# Patient Record
Sex: MALE | Race: OTHER | Employment: FULL TIME | URBAN - METROPOLITAN AREA
[De-identification: names, ages, dates, MRNs, and addresses within clinical notes are randomized per-mention and may not be internally consistent; named-entity substitution may affect disease eponyms.]

---

## 2022-03-19 ENCOUNTER — HOSPITAL ENCOUNTER (EMERGENCY)
Age: 32
Discharge: HOME | End: 2022-03-19
Attending: EMERGENCY MEDICINE
Payer: OTHER MISCELLANEOUS

## 2022-03-19 VITALS
SYSTOLIC BLOOD PRESSURE: 138 MMHG | DIASTOLIC BLOOD PRESSURE: 70 MMHG | BODY MASS INDEX: 32.11 KG/M2 | RESPIRATION RATE: 18 BRPM | HEIGHT: 67 IN | OXYGEN SATURATION: 100 % | TEMPERATURE: 97.7 F | HEART RATE: 78 BPM | WEIGHT: 204.59 LBS

## 2022-03-19 DIAGNOSIS — S61.211A LACERATION OF LEFT INDEX FINGER WITHOUT FOREIGN BODY WITHOUT DAMAGE TO NAIL, INITIAL ENCOUNTER: Primary | ICD-10-CM

## 2022-03-19 PROCEDURE — 99282 EMERGENCY DEPT VISIT SF MDM: CPT

## 2022-03-19 PROCEDURE — 75810000293 HC SIMP/SUPERF WND  RPR

## 2022-03-19 NOTE — ED PROVIDER NOTES
EMERGENCY DEPARTMENT HISTORY AND PHYSICAL EXAM    Date: 3/19/2022  Patient Name: Douglas Daugherty    History of Presenting Illness     Time Seen: 2:19 PM    Chief Complaint   Patient presents with    Laceration       History Provided By: Patient    Additional History (Context):   Douglas Daugherty is a 27 y.o. male works at [employer] presents emergency room with complaints of laceration to his left hand index finger after accidentally cutting himself with a knife. Complains of laceration to the back of his index finger. Some bleeding but controlled by holding pressure. No numbness or tingling. No other injuries. He is right-hand dominant. PCP: None        Past History     Past Medical History:  Past Medical History:   Diagnosis Date    Diabetes [facility])        Past Surgical History:  History reviewed. No pertinent surgical history. Family History:  History reviewed. No pertinent family history. Social History:  Social History     Tobacco Use    Smoking status: Never Smoker    Smokeless tobacco: Not on file   Substance Use Topics    Alcohol use: Not Currently    Drug use: Not Currently       Allergies:  No Known Allergies      Review of Systems   Review of Systems   Skin: Positive for wound. Left index finger laceration   Neurological: Negative for weakness and numbness. All other systems reviewed and are negative. Physical Exam     Vitals:    03/19/22 1351 03/19/22 1545   BP: (!) 147/78 138/70   Pulse: 81 78   Resp: 16 18   Temp: 97.7 °F (36.5 °C)    SpO2: 99% 100%   Weight: 92.8 kg (204 lb 9.4 oz)    Height: 5' 7\" (1.702 m)      Physical Exam  Vitals and nursing note reviewed. Constitutional:       General: He is not in acute distress. Appearance: Normal appearance. He is well-developed, well-groomed and normal weight. Comments: 25-year-old male no apparent distress. Vital signs are stable. Cooperative.    Cardiovascular:      Rate and Rhythm: Normal rate and regular rhythm. Heart sounds: Normal heart sounds. Pulmonary:      Effort: Pulmonary effort is normal.      Breath sounds: Normal breath sounds. Musculoskeletal:      Left hand: Laceration and tenderness present. No swelling or bony tenderness. Normal range of motion. Normal strength. Normal sensation. Normal capillary refill. Comments: Left hand -index finger overlying the proximal phalanx dorsally reveals a 2.5 cm linear laceration. Full-thickness laceration. No active bleeding. Unable to visualize deep tendon. Patient has full range of motion in the index finger with no deficits in strength. Neurovascular intact distally. Skin:     General: Skin is warm and dry. Neurological:      Mental Status: He is alert and oriented to person, place, and time. Psychiatric:         Behavior: Behavior is cooperative. Nursing note and vitals reviewed         Diagnostic Study Results     Labs -   No results found for this or any previous visit (from the past 12 hour(s)). Radiologic Studies   No orders to display     CT Results  (Last 48 hours)    None        CXR Results  (Last 48 hours)    None            Medical Decision Making   I am the first provider for this patient. I reviewed the vital signs, available nursing notes, past medical history, past surgical history, family history and social history. Vital Signs-Reviewed the patient's vital signs. Records Reviewed: Nursing Notes    DDX:  Laceration left index finger    Procedures:  Wound Repair    Date/Time: 3/20/2022 3:29 PM  Performed by: PA ([address] provider: Roro Ledesma  Preparation: skin prepped with Betadine  Pre-procedure re-eval: Immediately prior to the procedure, the patient was reevaluated and found suitable for the planned procedure and any planned medications.   Location details: left index finger  Wound length:2.5 cm or less  Anesthesia: local infiltration    Anesthesia:  Local Anesthetic: lidocaine 1% without epinephrine  Anesthetic total: 3 mL  Foreign bodies: no foreign bodies  Irrigation solution: saline  Irrigation method: syringe  Debridement: none  Skin closure: Prolene (5-0)  Number of sutures: 6  Technique: simple  Approximation: close  Dressing: antibiotic ointment and tube gauze  Patient tolerance: patient tolerated the procedure well with no immediate complications  My total time at bedside, performing this procedure was 16-30 minutes. ED Course:   Initial assessment performed. The patients presenting problems have been discussed, and they are in agreement with the care plan formulated and outlined with them. I have encouraged them to ask questions as they arise throughout their visit. ED Physician Discussion Note:   Patient tolerated procedure well  Stitches out in 10 days  Limited use of hand  Tylenol for pain    Diagnosis and Disposition       DISCHARGE NOTE:  Maxi Kimble's  results have been reviewed with him. He has been counseled regarding his diagnosis, treatment, and plan. He verbally conveys understanding and agreement of the signs, symptoms, diagnosis, treatment and prognosis and additionally agrees to follow up as discussed. He also agrees with the care-plan and conveys that all of his questions have been answered. I have also provided discharge instructions for him that include: educational information regarding their diagnosis and treatment, and list of reasons why they would want to return to the ED prior to their follow-up appointment, should his condition change. He has been provided with education for proper emergency department utilization. CLINICAL IMPRESSION:    1. Laceration of left index finger without foreign body without damage to nail, initial encounter        PLAN:  1. D/C Home  2. There are no discharge medications for this patient.     3.   Follow-up Information     Follow up With Specialties Details Why [address]    [facility] EMERGENCY DEPT Emergency Medicine  Return to this department in 10 days to have sutures removed [address] [place] [address]  [phone]        ____________________________________     Please note that this dictation was completed with [employer], the computer voice recognition software. Quite often unanticipated grammatical, syntax, homophones, and other interpretive errors are inadvertently transcribed by the computer software. Please disregard these errors. Please excuse any errors that have escaped final proofreading.

## 2022-03-19 NOTE — DISCHARGE INSTRUCTIONS
Stitches out in 10 days  Limited use of hand/finger  Try to keep finger clean and dry  Tylenol for pain  Apply bacitracin/Neosporin to the wound for the next 2 days

## 2022-03-28 ENCOUNTER — HOSPITAL ENCOUNTER (EMERGENCY)
Age: 32
Discharge: HOME | End: 2022-03-28
Attending: EMERGENCY MEDICINE | Admitting: STUDENT IN AN ORGANIZED HEALTH CARE EDUCATION/TRAINING PROGRAM

## 2022-03-28 VITALS
WEIGHT: 200 LBS | RESPIRATION RATE: 16 BRPM | TEMPERATURE: 97.7 F | OXYGEN SATURATION: 100 % | BODY MASS INDEX: 31.39 KG/M2 | HEIGHT: 67 IN | DIASTOLIC BLOOD PRESSURE: 72 MMHG | HEART RATE: 98 BPM | SYSTOLIC BLOOD PRESSURE: 132 MMHG

## 2022-03-28 DIAGNOSIS — Z48.02 ENCOUNTER FOR REMOVAL OF SUTURES: Primary | ICD-10-CM

## 2022-03-28 PROCEDURE — 75810000275 HC EMERGENCY DEPT VISIT NO LEVEL OF CARE

## 2022-03-28 RX ORDER — METFORMIN HYDROCHLORIDE 1000 MG/1
1000 TABLET ORAL 2 TIMES DAILY WITH MEALS
COMMUNITY

## 2022-03-28 NOTE — ED PROVIDER NOTES
EMERGENCY DEPARTMENT HISTORY AND PHYSICAL EXAM    Date: 3/28/2022  Patient Name: Donnice Frankel    History of Presenting Illness     Chief Complaint   Patient presents with    Suture Removal         History Provided By: Patient    Chief Complaint: suture removal    HPI(Context):   4:03 PM  Donnice Frankel is a 27 y.o. male with PMHX of DM who presents to the emergency department C/O suture removal. Pt had 6 simple interrupted sutures placed to left index finger at this facility on 03/19/2022. Pt notes no problems. Healing well. Pt denies numbness, weakness, color change, wound drainage, and any other sxs or complaints. PCP: None    Current Outpatient Medications   Medication Sig Dispense Refill    metFORMIN (GLUCOPHAGE) 1,000 mg tablet Take 1,000 mg by mouth two (2) times daily (with meals). Past History     Past Medical History:  Past Medical History:   Diagnosis Date    Diabetes [facility])        Past Surgical History:  History reviewed. No pertinent surgical history. Family History:  History reviewed. No pertinent family history. Social History:  Social History     Tobacco Use    Smoking status: Never Smoker    Smokeless tobacco: Not on file   Substance Use Topics    Alcohol use: Not Currently    Drug use: Not Currently       Allergies:  No Known Allergies      Review of Systems   Review of Systems   Skin: Positive for wound. Negative for color change. Neurological: Negative for weakness and numbness. All other systems reviewed and are negative. Physical Exam     Vitals:    03/28/22 1536   BP: 132/72   Pulse: 98   Resp: 16   Temp: 97.7 °F (36.5 °C)   SpO2: 100%   Weight: 90.7 kg (200 lb)   Height: 5' 7\" (1.702 m)     Physical Exam  Vitals and nursing note reviewed. Constitutional:       General: He is not in acute distress. Appearance: He is well-developed. He is not diaphoretic. Comments:  male in NAD. Alert.  In RW   HENT:      Head: Normocephalic and atraumatic. Right Ear: External ear normal.      Left Ear: External ear normal.      Nose: Nose normal.   Eyes:      Conjunctiva/sclera: Conjunctivae normal.   Pulmonary:      Effort: Pulmonary effort is normal. No tachypnea or accessory muscle usage. Breath sounds: No decreased breath sounds. Musculoskeletal:         General: Normal range of motion. Hands:       Cervical back: Normal range of motion. Skin:     General: Skin is warm and dry. Neurological:      Mental Status: He is alert and oriented to person, place, and time. Psychiatric:         Judgment: Judgment normal.             Diagnostic Study Results     Labs -   No results found for this or any previous visit (from the past 12 hour(s)). No orders to display     CT Results  (Last 48 hours)    None        CXR Results  (Last 48 hours)    None          Medications given in the ED-  Medications - No data to display      Medical Decision Making   I am the first provider for this patient. I reviewed the vital signs, available nursing notes, past medical history, past surgical history, family history and social history. Vital Signs-Reviewed the patient's vital signs. Pulse Oximetry Analysis - 100% on RA. NORMAL     Records Reviewed: Nursing Notes and Old Medical Records    Provider Notes (Medical Decision Making): suture removal    Procedures:  Suture/Staple Removal    Date/Time: 3/28/2022 4:15 PM  Performed by: Kalie Pitts PA-C  Authorized by: Kalie Pitts PA-C     Consent:     Consent obtained:  Verbal    Consent given by:  Patient  Location:     Location:  Upper extremity    Upper extremity location:  Hand    Hand location:  L index finger  Procedure details:     Wound appearance:  No signs of infection    Number of sutures removed:  6  Post-procedure details:     Post-removal:  No dressing applied    Patient tolerance of procedure:   Tolerated well, no immediate complications        ED Course:   4:03 PM Initial assessment performed. The patients presenting problems have been discussed, and they are in agreement with the care plan formulated and outlined with them. I have encouraged them to ask questions as they arise throughout their visit. Diagnosis and Disposition       Successful suture removal. Healed well. Reasons to RTED discussed with pt. All questions answered. Pt feels comfortable going home at this time. Pt expressed understanding and he agrees with plan. 1. Encounter for removal of sutures        PLAN:  1. D/C Home  2. Discharge Medication List as of 3/28/2022  4:12 PM        3. Follow-up Information     Follow up With Specialties Details Why [address]    [facility] EMERGENCY DEPT Emergency Medicine   [address] [place] [address]  [phone]        _______________________________    Attestations: This note is prepared by Surjit Almaguer PA-C.  _______________________________          Please note that this dictation was completed with [facility], the computer voice recognition software. Quite often unanticipated grammatical, syntax, homophones, and other interpretive errors are inadvertently transcribed by the computer software. Please disregard these errors. Please excuse any errors that have escaped final proofreading.